# Patient Record
Sex: MALE | Race: WHITE | NOT HISPANIC OR LATINO | URBAN - METROPOLITAN AREA
[De-identification: names, ages, dates, MRNs, and addresses within clinical notes are randomized per-mention and may not be internally consistent; named-entity substitution may affect disease eponyms.]

---

## 2019-12-18 ENCOUNTER — OFFICE VISIT - HEALTHEAST (OUTPATIENT)
Dept: FAMILY MEDICINE | Facility: CLINIC | Age: 34
End: 2019-12-18

## 2019-12-18 DIAGNOSIS — R07.0 THROAT PAIN: ICD-10-CM

## 2019-12-18 DIAGNOSIS — L01.00 IMPETIGO: ICD-10-CM

## 2019-12-18 DIAGNOSIS — J06.9 UPPER RESPIRATORY TRACT INFECTION, UNSPECIFIED TYPE: ICD-10-CM

## 2019-12-18 LAB — DEPRECATED S PYO AG THROAT QL EIA: NORMAL

## 2019-12-18 ASSESSMENT — MIFFLIN-ST. JEOR: SCORE: 2049.44

## 2019-12-19 LAB — GROUP A STREP BY PCR: NORMAL

## 2021-06-04 VITALS
WEIGHT: 230.31 LBS | RESPIRATION RATE: 20 BRPM | DIASTOLIC BLOOD PRESSURE: 76 MMHG | SYSTOLIC BLOOD PRESSURE: 118 MMHG | OXYGEN SATURATION: 98 % | HEIGHT: 74 IN | TEMPERATURE: 98.6 F | HEART RATE: 81 BPM | BODY MASS INDEX: 29.56 KG/M2

## 2021-06-04 NOTE — PROGRESS NOTES
"Chief Complaint   Patient presents with     Cold     running nose, taking advil, rash under nose.      Sore Throat     Headache       HPI:  Khoi Umaña is a 34 y.o. male who presents today with nasal congestion and rhinorrhea for 2 weeks. Has some ear pressure, sinus pressure comes and goes. L side lymph node enlarged and tender. Mild cough that is dry. No sOB, wheeze, chest pain, n/v/d.  No fevers, chills, body aches  Advil cold and sinus has helped    Problem List:  Hematuria      No past medical history on file.    No current outpatient medications on file prior to visit.     No current facility-administered medications on file prior to visit.         Social History     Tobacco Use     Smoking status: Current Some Day Smoker     Smokeless tobacco: Never Used   Substance Use Topics     Alcohol use: Not on file       ROS:  As stated in HPI    Vitals:    12/18/19 1255   BP: 118/76   Patient Site: Right Arm   Patient Position: Sitting   Cuff Size: Adult Large   Pulse: 81   Resp: 20   Temp: 98.6  F (37  C)   TempSrc: Oral   SpO2: 98%   Weight: (!) 230 lb 5 oz (104.5 kg)   Height: 6' 2\" (1.88 m)       Physical Exam:  General: Alert, No apparent distress, Cooperative  SKIN: small area of denuded, erythematous lesion with honey colored crust below L nare  HENT: HEAD: ATNC,   EYES: Conjunctiva clear, EOMI  EARS: TM pearly with out erythema or bulging bilaterally  NOSE: Nares Patent. sinuses nontender  THROAT: No Posterior oropharynx erythema, tonsils 0+ bilaterally, no exudate, uvula midline, non occluded  NECK: Supple, non tender, no cervical adenopathy  LUNGS: No respiratory distress, retraction or flaring. clear lung sounds in all fields, no wheezes, rales, crackles or rhonchi   CV: RRR, no murmurs, rubs or gallops, no cyanosis  NUERO: AOx3, normal mentation  PSYCH: normal mood and affect        No notes on file    Labs:  Recent Results (from the past 72 hour(s))   Rapid Strep A Screen-Throat   Result Value Ref " Range    Rapid Strep A Antigen No Group A Strep detected, presumptive negative No Group A Strep detected, presumptive negative       Radiology:  Xray: personal interpretation:  No results found.    Assessment and Plan:  1. Upper respiratory tract infection, unspecified type     2. Throat pain  Rapid Strep A Screen-Throat    Group A Strep, RNA Direct Detection, Throat   3. Impetigo  mupirocin (BACTROBAN) 2 % ointment      Patient's symptoms consistent with mld viral URI and will continue with OTC supportive measures. Also recommended flonase for his rhinorrhea. Appears to have a skin infection and likely staph. Patient will start Bactroban and follow up if not improving wihtin 36 hours.   Codey Chavez PA-C

## 2021-06-17 NOTE — PATIENT INSTRUCTIONS - HE
Patient Instructions by Codey Chavez PA-C at 12/18/2019 12:40 PM     Author: Codey Chavez PA-C Service: -- Author Type: Physician Assistant    Filed: 12/18/2019  1:22 PM Encounter Date: 12/18/2019 Status: Addendum    : Codey Chavez PA-C (Physician Assistant)    Related Notes: Original Note by Codey Chavez PA-C (Physician Assistant) filed at 12/18/2019  1:21 PM       flonase 2 sprays in each nostril once a day  Patient Education     Viral Upper Respiratory Illness (Adult)  You have a viral upper respiratory illness (URI), which is another term for the common cold. This illness is contagious during the first few days. It is spread through the air by coughing and sneezing. It may also be spread by direct contact (touching the sick person and then touching your own eyes, nose, or mouth). Frequent handwashing will decrease risk of spread. Most viral illnesses go away within 7 to 10 days with rest and simple home remedies. Sometimes the illness may last for several weeks. Antibiotics will not kill a virus, and they are generally not prescribed for this condition.    Home care    If symptoms are severe, rest at home for the first 2 to 3 days. When you resume activity, don't let yourself get too tired.    Avoid being exposed to cigarette smoke (yours or others).    You may use acetaminophen or ibuprofen to control pain and fever, unless another medicine was prescribed. If you have chronic liver or kidney disease, have ever had a stomach ulcer or gastrointestinal bleeding, or are taking blood-thinning medicines, talk with your healthcare provider before using these medicines. Aspirin should never be given to anyone under 18 years of age who is ill with a viral infection or fever. It may cause severe liver or brain damage.    Your appetite may be poor, so a light diet is fine. Avoid dehydration by drinking 6 to 8 glasses of fluids per day (water, soft drinks, juices, tea, or  soup). Extra fluids will help loosen secretions in the nose and lungs.    Over-the-counter cold medicines will not shorten the length of time youre sick, but they may be helpful for the following symptoms: cough, sore throat, and nasal and sinus congestion. (Note: Do not use decongestants if you have high blood pressure.)  Follow-up care  Follow up with your healthcare provider, or as advised.  When to seek medical advice  Call your healthcare provider right away if any of these occur:    Cough with lots of colored sputum (mucus)    Severe headache; face, neck, or ear pain    Difficulty swallowing due to throat pain    Fever of 100.4 F (38 C) or higher, or as directed by your healthcare provider  Call 911  Call 911 if any of these occur:    Chest pain, shortness of breath, wheezing, or difficulty breathing    Coughing up blood    Inability to swallow due to throat pain  Date Last Reviewed: 9/13/2015 2000-2017 [employer]. [address] [place] [address], [place] [address]. All rights reserved. This information is not intended as a substitute for professional medical care. Always follow your healthcare professional's instructions.